# Patient Record
Sex: FEMALE | URBAN - METROPOLITAN AREA
[De-identification: names, ages, dates, MRNs, and addresses within clinical notes are randomized per-mention and may not be internally consistent; named-entity substitution may affect disease eponyms.]

---

## 2024-09-25 ENCOUNTER — LAB OUTSIDE AN ENCOUNTER (OUTPATIENT)
Dept: URBAN - METROPOLITAN AREA CLINIC 84 | Facility: CLINIC | Age: 69
End: 2024-09-25

## 2024-09-25 ENCOUNTER — DASHBOARD ENCOUNTERS (OUTPATIENT)
Age: 69
End: 2024-09-25

## 2024-09-25 ENCOUNTER — OFFICE VISIT (OUTPATIENT)
Dept: URBAN - METROPOLITAN AREA CLINIC 84 | Facility: CLINIC | Age: 69
End: 2024-09-25

## 2024-09-25 VITALS
HEIGHT: 63 IN | WEIGHT: 128.8 LBS | TEMPERATURE: 97.1 F | BODY MASS INDEX: 22.82 KG/M2 | DIASTOLIC BLOOD PRESSURE: 70 MMHG | HEART RATE: 75 BPM | SYSTOLIC BLOOD PRESSURE: 152 MMHG

## 2024-09-25 PROBLEM — 79890006: Status: ACTIVE | Noted: 2024-09-25

## 2024-09-25 RX ORDER — ONDANSETRON 8 MG/1
1 TABLET ON THE TONGUE AND ALLOW TO DISSOLVE  AS NEEDED TABLET, ORALLY DISINTEGRATING ORAL ONCE A DAY
Status: ACTIVE | COMMUNITY

## 2024-09-25 RX ORDER — CETIRIZINE HYDROCHLORIDE 10 MG/1
1 TABLET TABLET, FILM COATED ORAL ONCE A DAY
Status: ACTIVE | COMMUNITY

## 2024-09-25 RX ORDER — ATORVASTATIN CALCIUM 40 MG/1
1 TABLET TABLET, FILM COATED ORAL ONCE A DAY
Status: ACTIVE | COMMUNITY

## 2024-09-25 RX ORDER — LOSARTAN POTASSIUM 50 MG/1
1 TABLET TABLET, FILM COATED ORAL ONCE A DAY
Status: ACTIVE | COMMUNITY

## 2024-09-25 RX ORDER — FAMOTIDINE 40 MG/1
1 TABLET AT BEDTIME TABLET, FILM COATED ORAL ONCE A DAY
Status: ACTIVE | COMMUNITY

## 2024-09-25 RX ORDER — TRAMADOL HYDROCHLORIDE 50 MG/1
1 TABLET AS NEEDED TABLET, FILM COATED ORAL ONCE A DAY
Status: ON HOLD | COMMUNITY

## 2024-09-25 RX ORDER — ALPRAZOLAM 0.5 MG/1
1 TABLET TABLET ORAL TWICE A DAY
Status: ACTIVE | COMMUNITY

## 2024-10-03 ENCOUNTER — CLAIMS CREATED FROM THE CLAIM WINDOW (OUTPATIENT)
Dept: URBAN - METROPOLITAN AREA CLINIC 4 | Facility: CLINIC | Age: 69
End: 2024-10-03
Payer: COMMERCIAL

## 2024-10-03 ENCOUNTER — OFFICE VISIT (OUTPATIENT)
Dept: URBAN - METROPOLITAN AREA SURGERY CENTER 20 | Facility: SURGERY CENTER | Age: 69
End: 2024-10-03
Payer: COMMERCIAL

## 2024-10-03 ENCOUNTER — TELEPHONE ENCOUNTER (OUTPATIENT)
Dept: URBAN - METROPOLITAN AREA CLINIC 84 | Facility: CLINIC | Age: 69
End: 2024-10-03

## 2024-10-03 DIAGNOSIS — R11.0 NAUSEA: ICD-10-CM

## 2024-10-03 DIAGNOSIS — K29.70 GASTRITIS: ICD-10-CM

## 2024-10-03 DIAGNOSIS — K31.7 BENIGN GASTRIC POLYP: ICD-10-CM

## 2024-10-03 DIAGNOSIS — R63.0 ANOREXIA: ICD-10-CM

## 2024-10-03 DIAGNOSIS — K31.89 OTHER DISEASES OF STOMACH AND DUODENUM: ICD-10-CM

## 2024-10-03 DIAGNOSIS — R68.81 EARLY SATIETY: ICD-10-CM

## 2024-10-03 DIAGNOSIS — R14.2 BELCHING: ICD-10-CM

## 2024-10-03 DIAGNOSIS — K31.7 POLYP OF STOMACH AND DUODENUM: ICD-10-CM

## 2024-10-03 DIAGNOSIS — K31.7 GASTRIC POLYPS: ICD-10-CM

## 2024-10-03 PROCEDURE — 88312 SPECIAL STAINS GROUP 1: CPT | Performed by: PATHOLOGY

## 2024-10-03 PROCEDURE — 43239 EGD BIOPSY SINGLE/MULTIPLE: CPT | Performed by: INTERNAL MEDICINE

## 2024-10-03 PROCEDURE — 00731 ANES UPR GI NDSC PX NOS: CPT | Performed by: NURSE ANESTHETIST, CERTIFIED REGISTERED

## 2024-10-03 PROCEDURE — 88305 TISSUE EXAM BY PATHOLOGIST: CPT | Performed by: PATHOLOGY

## 2024-10-03 RX ORDER — CETIRIZINE HYDROCHLORIDE 10 MG/1
1 TABLET TABLET, FILM COATED ORAL ONCE A DAY
Status: ACTIVE | COMMUNITY

## 2024-10-03 RX ORDER — TRAMADOL HYDROCHLORIDE 50 MG/1
1 TABLET AS NEEDED TABLET, FILM COATED ORAL ONCE A DAY
Status: ON HOLD | COMMUNITY

## 2024-10-03 RX ORDER — ATORVASTATIN CALCIUM 40 MG/1
1 TABLET TABLET, FILM COATED ORAL ONCE A DAY
Status: ACTIVE | COMMUNITY

## 2024-10-03 RX ORDER — FAMOTIDINE 40 MG/1
1 TABLET AT BEDTIME TABLET, FILM COATED ORAL ONCE A DAY
Status: ACTIVE | COMMUNITY

## 2024-10-03 RX ORDER — ALPRAZOLAM 0.5 MG/1
1 TABLET TABLET ORAL TWICE A DAY
Status: ACTIVE | COMMUNITY

## 2024-10-03 RX ORDER — ONDANSETRON 8 MG/1
1 TABLET ON THE TONGUE AND ALLOW TO DISSOLVE  AS NEEDED TABLET, ORALLY DISINTEGRATING ORAL ONCE A DAY
Status: ACTIVE | COMMUNITY

## 2024-10-03 RX ORDER — LOSARTAN POTASSIUM 50 MG/1
1 TABLET TABLET, FILM COATED ORAL ONCE A DAY
Status: ACTIVE | COMMUNITY

## 2024-12-02 ENCOUNTER — OFFICE VISIT (OUTPATIENT)
Dept: URBAN - METROPOLITAN AREA CLINIC 84 | Facility: CLINIC | Age: 69
End: 2024-12-02
Payer: COMMERCIAL

## 2024-12-02 VITALS
SYSTOLIC BLOOD PRESSURE: 150 MMHG | HEART RATE: 89 BPM | WEIGHT: 123 LBS | HEIGHT: 63 IN | DIASTOLIC BLOOD PRESSURE: 76 MMHG | TEMPERATURE: 98.1 F | BODY MASS INDEX: 21.79 KG/M2

## 2024-12-02 DIAGNOSIS — R11.0 NAUSEA: ICD-10-CM

## 2024-12-02 DIAGNOSIS — R14.2 ERUCTATION: ICD-10-CM

## 2024-12-02 DIAGNOSIS — R68.81 EARLY SATIETY: ICD-10-CM

## 2024-12-02 DIAGNOSIS — R63.0 ANOREXIA: ICD-10-CM

## 2024-12-02 PROCEDURE — 99213 OFFICE O/P EST LOW 20 MIN: CPT

## 2024-12-02 RX ORDER — ACETAMINOPHEN 325 MG/1
1 TABLET AS NEEDED TABLET, FILM COATED ORAL
Status: ACTIVE | COMMUNITY

## 2024-12-02 RX ORDER — LOSARTAN POTASSIUM 50 MG/1
1 TABLET TABLET, FILM COATED ORAL ONCE A DAY
Status: ACTIVE | COMMUNITY

## 2024-12-02 RX ORDER — TRAMADOL HYDROCHLORIDE 50 MG/1
1 TABLET AS NEEDED TABLET, FILM COATED ORAL ONCE A DAY
Status: ON HOLD | COMMUNITY

## 2024-12-02 RX ORDER — FAMOTIDINE 40 MG/1
1 TABLET AT BEDTIME TABLET, FILM COATED ORAL ONCE A DAY
Status: ON HOLD | COMMUNITY

## 2024-12-02 RX ORDER — ONDANSETRON 8 MG/1
1 TABLET ON THE TONGUE AND ALLOW TO DISSOLVE  AS NEEDED TABLET, ORALLY DISINTEGRATING ORAL ONCE A DAY
Status: ON HOLD | COMMUNITY

## 2024-12-02 RX ORDER — ALPRAZOLAM 0.5 MG/1
1 TABLET TABLET ORAL TWICE A DAY
Status: ON HOLD | COMMUNITY

## 2024-12-02 RX ORDER — ATORVASTATIN CALCIUM 40 MG/1
1 TABLET TABLET, FILM COATED ORAL ONCE A DAY
Status: ACTIVE | COMMUNITY

## 2024-12-02 RX ORDER — CETIRIZINE HYDROCHLORIDE 10 MG/1
1 TABLET TABLET, FILM COATED ORAL ONCE A DAY
Status: ACTIVE | COMMUNITY

## 2024-12-02 NOTE — HPI-TODAY'S VISIT:
12/24 OV  Patient presents for f/u, notes symptoms have improved, appetite has returned, deneis nay early satiety. S/p EGD which revealed non- erosive gastritis, patient completed a course of omeprazole 40mg, notes she is feeling much better. No chemo/radiation, completed lumpectomy in 03/24.   EGD 2024  Z-line regular, 40 cm from the incisors. Normal esophagus. Non-erosive gastritis, characterized by erythema. Biopsied. A few gastric polyps. Resected and retrieved.  Normal examined duodenum.

## 2024-12-09 ENCOUNTER — OFFICE VISIT (OUTPATIENT)
Dept: URBAN - METROPOLITAN AREA CLINIC 84 | Facility: CLINIC | Age: 69
End: 2024-12-09

## 2025-04-07 ENCOUNTER — OFFICE VISIT (OUTPATIENT)
Dept: URBAN - METROPOLITAN AREA CLINIC 84 | Facility: CLINIC | Age: 70
End: 2025-04-07